# Patient Record
Sex: MALE | Race: WHITE
[De-identification: names, ages, dates, MRNs, and addresses within clinical notes are randomized per-mention and may not be internally consistent; named-entity substitution may affect disease eponyms.]

---

## 2017-06-27 NOTE — CR
EXAMINATION: Portable chest radiograph.

 

HISTORY: Pain.

 

FINDINGS: 

The trachea is midline. The heart is normal in size. There is a left-sided pacemaker, valvular repla
cement, and median sternotomy wires. The cardiomediastinal silhouette is within normal limits. No pu
lmonary infiltrates, effusions or pneumothorax.

 

Osseous structures appear unremarkable.

 

IMPRESSION: 

No acute cardiopulmonary process.
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
## 2019-04-01 NOTE — OR
SURGEON:

Walter Luis MD

 

DATE OF PROCEDURE:  04/01/2019

 

ASSISTANT:

Angy Waters PA-C.

 

PREOPERATIVE DIAGNOSIS:

Left hip osteoarthritis.

 

POSTOPERATIVE DIAGNOSIS:

Left hip osteoarthritis.

 

OPERATION PERFORMED:

Left anterior total hip arthroplasty.

 

ANESTHESIA:

Spinal with sedation.

 

COMPLICATION:

None.

 

SPECIMENS:

Femoral head.

 

ESTIMATED BLOOD LOSS:

100 mL.

 

IMPLANTS:

Flo Continuum trabecular metal shell with cluster holes, 58 mm outer

diameter, Vivacit-E neutral liner of 36-mm inner diameter, Fitmore hip stem

uncemented B extended offset size 7, Biolox ceramic femoral head 36 mm diameter,

and zero neck length.

 

INDICATIONS:

The patient is a 74-year-old male with severe arthritis.  He has failed

conservative management.  He has failed conservative management, modification

therapy, injections, chronic pain on a daily basis, hindering all activities.

He wished to undergo replacement.  He understands the risks, benefits, and

complications to include but not limited to infection, neurovascular injury,

continued pain, DVT, PE, stroke, MI, death, leg-length discrepancy, fracture,

dislocation, he wished to proceed.

 

PROCEDURE IN DETAIL:

The patient was seen in the preoperative area.  Operative extremity was marked

of the patient.  He was transferred to the operating room, where spinal

anesthesia was given.  He was placed supine on the [place] table, and sedation was

given.  The legs were placed in the leg bars with a narrow perineal post.  The

right hip was prepped and draped in the usual sterile fashion using alcohol

followed by ChloraPrep with Ioban covering.  He received preop antibiotics Ancef

2 g and TXA.  A 5 cm incision starting just lateral to the ASIS was made going

obliquely down the femur.  Hemostasis was obtained.  The fascia overlying the

TFL was opened lateral to lateral femoral cutaneous nerve.  The interval between

the TFL and sartorius deep between the abductors and rectus was opened.  The

anterior vessels were coagulated, and the vastus lateralis fascia was opened.

The deep Jesus Alberto retractor was placed.  The indirect head of the rectus was

released.  The capsule was held and tagged with two sutures.  Deep retractors

were placed.  The neck was cut from the saddle region to 1 cm below the lesser

trochanter, and the head was removed.  There was severe arthritis with complete

ossification of the labrum as well as inferior osteophyte.  The inferior capsule

was released, and portions of the iliopsoas tendon due to severe tightness.  The

pulvinar was removed, and the head measured approximately 51 to 52 mm.  It was

sequentially reamed from 51 to 57 mm going slightly superomedially to get good

fit and fill.  After planing the bed to make sure it was level, under

fluoroscopic control Continuum trabecular metal shell with cluster holes 58 mm

diameter was placed in 10 degrees anteversion and 40 degrees of abduction.  The

some of the labral ossification did break off.  It had excellent press fit.

Neutral liner was impacted.  The leg was then externally rotated, abducted, and

extended, and placed in a femoral lift.  The superior capsule obturator,

internus, and piriformis were released.  Central canal finder was utilized.  The

hip was sequentially broached from a starter rasp up to size a 6.  It was trial

reduced with extended offset 0 neck.  Printed overlay technique showed equal leg

length and offset, the stem to be slightly undersized, and therefore the hip was

dislocated, broach backed up to size 7, which did sink slightly below the cut.

Therefore a final stem B extended offset size 7 was impacted following the

native version.  It was trial reduced to zero neck length, which showed equal

leg length and offset compared to the opposite side.  Therefore, the hip was

dislocated.  The final stem 36 mm diameter, zero neck length was impacted.  The

hip was relocated.  There was no Shuck.  There was stable range of motion.  The

two tag sutures were tied together.  The wound was irrigated throughout the

case.  The fascia was closed with number one Vicryl, the subcutaneous tissues

with 2-0 Stratafix, and skin with running 4-0 Monocryl.  Dermabond, tape, and

Aquacel were placed.  The patient was transferred to the recovery room stable

condition.  Sponge and needle counts were correct at the end of the case.  There

were no complications.  He will be weightbearing status as tolerated, with

aspirin for DVT prophylaxis.

 

 

MARQUEZ AGOSTO

DD:  04/01/2019 14:56:11

DT:  04/01/2019 22:57:32

Job #:  [id number]

## 2019-04-01 NOTE — PCM.OPNOTE
- General Post-Op/Procedure Note


Date of Surgery/Procedure: 04/01/19


Operative Procedure(s): left anterior total hip arthroplasty


Findings: 





severe OA


Pre Op Diagnosis: left hip osteoarthritis


Post-Op Diagnosis: same


Anesthesia Technique: Moderate Sedation, Spinal


Primary Surgeon: Walter HINOJOSA Mai


Assistant: Angy Waters


Pathology: 





femoral head


EBL in mLs: 400


Complications: none


Condition: Good

## 2019-04-01 NOTE — CR
EXAMINATION: Left hip

 

HISTORY: Total hip hardware

 

COMPARISON: 2/22/2019

 

TECHNIQUE: 3 views

 

FINDINGS/IMPRESSION: Operative control films demonstrate placement of left total

hip hardware in good position and alignment.
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
## 2019-04-02 NOTE — PCM.DCSUM1
**Discharge Summary





- Hospital Course


Brief History: Patient was admitted for elective left total hip arthroplasty. 

Postoperatively he was measured floor where his pain was controlled his diet 

was advanced and he participated in physical therapy with weightbearing as 

tolerated. He did very well postoperatively and was subsequently discharged 

home on postoperative day #1. He'll receive aspirin for DVT prophylaxis. He'll 

follow-up in clinic in 2 weeks.


Diagnosis: Stroke: No





- Discharge Data


Discharge Date: 04/02/19


Discharge Disposition: Home, Self-Care 01


Condition: Good





- Patient Summary/Data


Operative Procedure(s) Performed: left anterior total hip arthroplasty


Consults: 


 Consultations





04/01/19 14:57


Consult to Physician [CONS] Routine 


PT Evaluation and Treatment [CONS] Routine 














- Patient Instructions


Diet: Usual Diet as Tolerated


Activity: Apply Ice, Full Weight Bearing


Driving, Other: may drive when not taking narcotics


Showering/Bathing: May Shower


Wound/Incision Care: Do NOT Change Dressing


Notify Provider of: Fever, Swelling and Redness, Drainage





- Discharge Plan


*PRESCRIPTION DRUG MONITORING PROGRAM REVIEWED*: No


*COPY OF PRESCRIPTION DRUG MONITORING REPORT IN PATIENT HEATHER: No


Home Medications: 


 Home Meds





Aspirin 325 mg PO QAM 06/27/17 [History]


Metoprolol Succinate [Toprol XL] 25 mg PO QAM 06/27/17 [History]


Sildenafil Citrate [Sildenafil] 50 mg PO ASDIRECTED PRN 06/27/17 [History]


Cholecalciferol (Vitamin D3) [Vitamin D3] 5,000 unit PO DAILY 03/28/19 [History]


Cyanocobalamin (Vitamin B12) [Vitamin B12] 1,000 mcg PO DAILY 03/28/19 [History]


Finasteride 5 mg PO QPM 03/28/19 [History]


Lisinopril 5 mg PO QAM 03/28/19 [History]


Multivit-Min/FA/Lycopen/Lutein [Centrum Silver Men Tablet] 1 tab PO DAILY 03/28/ 19 [History]


atorvaSTATin [Lipitor] 40 mg PO BEDTIME 03/28/19 [History]


metFORMIN [Glucophage XR] 500 mg PO BID 03/28/19 [History]








Patient Handouts:  Acetaminophen; Hydrocodone tablets or capsules, Total Hip 

Replacement, Easy-to-Read, Docusate capsules, Aspirin capsules or tablets 

extended release


Referrals: 


Angy Waters PA [Physician Assistant] - 04/16/19 10:20 am





- Discharge Summary/Plan Comment


DC Time >30 min.: No





- Patient Data


Vitals - Most Recent: 


 Last Vital Signs











Temp  37.0 C   04/02/19 11:57


 


Pulse  70   04/02/19 11:57


 


Resp  12   04/02/19 11:57


 


BP  126/57 L  04/02/19 11:57


 


Pulse Ox  93 L  04/02/19 11:57











Weight - Most Recent: 88.451 kg


I&O - Last 24 hours: 


 Intake & Output











 04/02/19 04/02/19 04/02/19





 06:59 14:59 22:59


 


Intake Total 1549 500 


 


Output Total 890 400 


 


Balance 659 100 











Lab Results - Last 24 hrs: 


 Laboratory Results - last 24 hr











  04/01/19 04/01/19 04/02/19 Range/Units





  19:00 19:00 05:10 


 


WBC  9.12    (4.0-11.0)  K/uL


 


RBC  3.88 L    (4.50-5.90)  M/uL


 


Hgb  12.0 L   10.6 L  (13.0-17.0)  g/dL


 


Hct  35.7 L   32.1 L  (38.0-50.0)  %


 


MCV  92.0    (80.0-98.0)  fL


 


MCH  30.9    (27.0-32.0)  pg


 


MCHC  33.6    (31.0-37.0)  g/dL


 


RDW Std Deviation  46.0    (28.0-62.0)  fl


 


RDW Coeff of Justen  14    (11.0-15.0)  %


 


Plt Count  203    (150-400)  K/uL


 


MPV  8.90    (7.40-12.00)  fL


 


Neut % (Auto)  75.8    (48.0-80.0)  %


 


Lymph % (Auto)  13.4 L    (16.0-40.0)  %


 


Mono % (Auto)  9.1    (0.0-15.0)  %


 


Eos % (Auto)  1.5    (0.0-7.0)  %


 


Baso % (Auto)  0.2    (0.0-1.5)  %


 


Neut # (Auto)  6.9 H    (1.4-5.7)  K/uL


 


Lymph # (Auto)  1.2    (0.6-2.4)  K/uL


 


Mono # (Auto)  0.8    (0.0-0.8)  K/uL


 


Eos # (Auto)  0.1    (0.0-0.7)  K/uL


 


Baso # (Auto)  0.0    (0.0-0.1)  K/uL


 


Nucleated RBC %  0.0    /100WBC


 


Nucleated RBCs #  0    K/uL


 


Sodium   143   (136-148)  mmol/L


 


Potassium   4.0   (3.5-5.1)  mmol/L


 


Chloride   108 H   ()  mmol/L


 


Carbon Dioxide   26.6   (21.0-32.0)  mmol/L


 


BUN   15   (7.0-18.0)  mg/dL


 


Creatinine   0.6 L   (0.8-1.3)  mg/dL


 


Est Cr Clr Drug Dosing   125.77   mL/min


 


Estimated GFR (MDRD)   > 60.0   ml/min


 


Glucose   158 H   ()  mg/dL


 


POC Glucose     ()  mg/dL


 


Calcium   9.3   (8.5-10.1)  mg/dL














  04/02/19 04/02/19 Range/Units





  05:10 06:12 


 


WBC    (4.0-11.0)  K/uL


 


RBC    (4.50-5.90)  M/uL


 


Hgb    (13.0-17.0)  g/dL


 


Hct    (38.0-50.0)  %


 


MCV    (80.0-98.0)  fL


 


MCH    (27.0-32.0)  pg


 


MCHC    (31.0-37.0)  g/dL


 


RDW Std Deviation    (28.0-62.0)  fl


 


RDW Coeff of Justen    (11.0-15.0)  %


 


Plt Count    (150-400)  K/uL


 


MPV    (7.40-12.00)  fL


 


Neut % (Auto)    (48.0-80.0)  %


 


Lymph % (Auto)    (16.0-40.0)  %


 


Mono % (Auto)    (0.0-15.0)  %


 


Eos % (Auto)    (0.0-7.0)  %


 


Baso % (Auto)    (0.0-1.5)  %


 


Neut # (Auto)    (1.4-5.7)  K/uL


 


Lymph # (Auto)    (0.6-2.4)  K/uL


 


Mono # (Auto)    (0.0-0.8)  K/uL


 


Eos # (Auto)    (0.0-0.7)  K/uL


 


Baso # (Auto)    (0.0-0.1)  K/uL


 


Nucleated RBC %    /100WBC


 


Nucleated RBCs #    K/uL


 


Sodium  138   (136-148)  mmol/L


 


Potassium  3.9   (3.5-5.1)  mmol/L


 


Chloride  106   ()  mmol/L


 


Carbon Dioxide  23.5   (21.0-32.0)  mmol/L


 


BUN  14   (7.0-18.0)  mg/dL


 


Creatinine  0.5 L   (0.8-1.3)  mg/dL


 


Est Cr Clr Drug Dosing  150.92   mL/min


 


Estimated GFR (MDRD)  > 60.0   ml/min


 


Glucose  192 H   ()  mg/dL


 


POC Glucose   203 H  ()  mg/dL


 


Calcium  9.0   (8.5-10.1)  mg/dL











Med Orders - Current: 


 Current Medications








Discontinued Medications





Hydrocodone Bitart/Acetaminophen (Norco 325-7.5 Mg)  1 - 2 tab PO Q4H PRN


   PRN Reason: Pain


   Last Admin: 04/02/19 09:11 Dose:  1 tab


Al Hydroxide/Mg Hydroxide (Mag-Al Plus)  30 ml PO Q4H PRN


   PRN Reason: indigestion


Albuterol (Proventil Neb Soln)  2.5 mg NEB ONETIME PRN


   PRN Reason: Wheezing


Aspirin (Aspirin)  325 mg PO BID [facility]


   Last Admin: 04/02/19 09:16 Dose:  325 mg


Atorvastatin Calcium (Lipitor)  40 mg PO BEDTIME [facility]


   Last Admin: 04/01/19 20:38 Dose:  40 mg


Atropine Sulfate (Atropine 1 Mg/Ml)  0.5 mg IVPUSH ASDIRECTED PRN


   PRN Reason: Hypo-perfusion


Atropine Sulfate (Atropine 1 Mg/Ml)  1 mg IVPUSH ASDIRECTED PRN


   PRN Reason: Hypo-Perfusion


Bisacodyl (Dulcolax)  10 mg RECTAL DAILY PRN


   PRN Reason: Constipation


Cefazolin Sodium (Ancef) Confirm Administered Dose 2 gm .ROUTE .STK-MED ONE


   Stop: 04/01/19 12:12


Dextrose/Water (Dextrose 50% In Water)  50 ml IVPUSH ASDIRECTED PRN


   PRN Reason: Hypoglycemia


Diphenhydramine HCl (Benadryl)  25 - 50 mg PO Q6H PRN


   PRN Reason: Itching


Docusate Sodium (Colace)  100 mg PO BID [facility]


   Last Admin: 04/02/19 09:14 Dose:  100 mg


Ephedrine Sulfate (Ephedrine Sulfate) Confirm Administered Dose 50 mg .ROUTE 

.STK-MED ONE


   Stop: 04/01/19 13:49


Epinephrine HCl (Adrenalin)  1 mg IVPUSH ASDIRECTED PRN


   PRN Reason: ACLS Guidelines


Fentanyl (Sublimaze) Confirm Administered Dose 100 mcg .ROUTE .STK-MED ONE


   Stop: 04/01/19 12:06


Fentanyl (Sublimaze)  50 mcg IVPUSH Q5M PRN


   PRN Reason: Pain


Finasteride (Proscar)  5 mg PO QPM [facility]


   Last Admin: 04/01/19 18:04 Dose:  5 mg


Cefazolin Sodium/Dextrose 2 gm (/ Premix)  50 mls @ 100 mls/hr IV ONETIME [facility]


   Last Admin: 04/02/19 05:22 Dose:  100 mls/hr


Lactated Ringer's (Ringers, Lactated)  1,000 mls @ 100 mls/hr IV ASDIRECTED [facility]


   Last Admin: 04/02/19 03:51 Dose:  100 mls/hr


Sodium Chloride (Normal Saline) Confirm Administered Dose 20 mls @ as directed 

.ROUTE .STK-MED ONE


   Stop: 04/01/19 12:12


Cefazolin Sodium/Dextrose 2 gm (/ Premix)  50 mls @ 100 mls/hr IV Q8H [facility]


   Stop: 04/02/19 05:59


   Last Admin: 04/02/19 05:59 Dose:  100 mls/hr


Insulin Aspart (Novolog)  0 unit SUBCUT TIDAC [facility]; Protocol


   Last Admin: 04/02/19 07:55 Dose:  2 units


Lidocaine (Xylocaine-Mpf 2%) Confirm Administered Dose 5 ml .ROUTE .STK-MED ONE


   Stop: 04/01/19 12:08


Lisinopril (Prinivil)  5 mg PO [facility]


   Last Admin: 04/02/19 09:13 Dose:  5 mg


Metoprolol Succinate ([facility] Xl)  [facility]


   Last Admin: 04/02/19 09:15 Dose:  25 mg


Midazolam HCl (Versed 1 Mg/Ml) Confirm Administered Dose 2 mg .ROUTE .STK-MED 

ONE


   Stop: 04/01/19 12:06


Morphine Sulfate (Morphine Sulfate)  1 - 3 mg IV Q3H PRN


   PRN Reason: Pain


Naloxone HCl (Narcan)  0.1 mg IVPUSH ASDIRECTED PRN


   PRN Reason: Respiratory Depression


Ondansetron HCl (Zofran)  4 mg IV Q6HR PRN


   PRN Reason: NAUSEA/VOMITING


Multivit-Min/Fa/Lycopen/Lutein [ Centrum Silver Men Tablet]  1 each PO DAILY [facility]


   Last Admin: 04/02/19 09:19 Dose:  Not Given


Phenylephrine HCl ([place]-Synephrine) Confirm Administered Dose 10 mg .ROUTE .STK-

MED ONE


   Stop: 04/01/19 14:14


Propofol (Diprivan  20 Ml) Confirm Administered Dose 400 mg .ROUTE .STK-MED ONE


   Stop: 04/01/19 12:06


Sodium Chloride (Saline Flush)  10 ml FLUSH ASDIRECTED PRN


   PRN Reason: Keep Vein Open


Sodium Chloride (Saline Flush)  2.5 ml FLUSH ASDIRECTED PRN


   PRN Reason: Keep Vein Open


Tranexamic Acid (Cyklokapron)  2,000 mg IV ONETIME ONE


   Stop: 04/01/19 07:06


   Last Admin: 04/01/19 16:04 Dose:  Not Given


Tranexamic Acid (Cyklokapron) Confirm Administered Dose 2,000 mg .ROUTE .STK-

MED ONE


   Stop: 04/01/19 12:53

## 2019-04-02 NOTE — PCM.CONSN
- General Info


Date of Service: 04/02/19


Admission Dx/Problem (Free Text): 


 Admission Diagnosis/Problem





Admission Diagnosis/Problem      Hip replacement planned








Subjective Update: 





Feeling good this morning. No complaints. No chest pain or SOB. Eager to go 

home today. 


Functional Status: Reports: Pain Controlled, Tolerating Diet, Ambulating





- Review of Systems


General: Reports: No Symptoms.  Denies: Fever, Weakness, Fatigue


HEENT: Reports: No Symptoms.  Denies: Headaches, Sore Throat, Visual Changes


Pulmonary: Reports: No Symptoms.  Denies: Shortness of Breath, Cough, Sputum


Cardiovascular: Reports: No Symptoms.  Denies: Chest Pain


Gastrointestinal: Reports: No Symptoms.  Denies: Abdominal Pain, Nausea, 

Vomiting


Genitourinary: Reports: No Symptoms.  Denies: Dysuria, Frequency


Musculoskeletal: Reports: No Symptoms


Skin: Reports: No Symptoms


Neurological: Reports: No Symptoms


Psychiatric: Reports: No Symptoms





- Patient Data


Vitals - Most Recent: 


 Last Vital Signs











Temp  98.4 F   04/02/19 07:24


 


Pulse  70   04/02/19 09:15


 


Resp  14   04/02/19 07:24


 


BP  116/68   04/02/19 09:15


 


Pulse Ox  96   04/02/19 07:24











Weight - Most Recent: 88.451 kg


I&O - Last 24 Hours: 


 Intake & Output











 04/01/19 04/02/19 04/02/19





 22:59 06:59 14:59


 


Intake Total 50 1549 


 


Output Total  890 


 


Balance 50 659 











Lab Results Last 24 Hours: 


 Laboratory Results - last 24 hr











  04/01/19 04/01/19 04/01/19 Range/Units





  15:32 19:00 19:00 


 


WBC   9.12   (4.0-11.0)  K/uL


 


RBC   3.88 L   (4.50-5.90)  M/uL


 


Hgb   12.0 L   (13.0-17.0)  g/dL


 


Hct   35.7 L   (38.0-50.0)  %


 


MCV   92.0   (80.0-98.0)  fL


 


MCH   30.9   (27.0-32.0)  pg


 


MCHC   33.6   (31.0-37.0)  g/dL


 


RDW Std Deviation   46.0   (28.0-62.0)  fl


 


RDW Coeff of Justen   14   (11.0-15.0)  %


 


Plt Count   203   (150-400)  K/uL


 


MPV   8.90   (7.40-12.00)  fL


 


Neut % (Auto)   75.8   (48.0-80.0)  %


 


Lymph % (Auto)   13.4 L   (16.0-40.0)  %


 


Mono % (Auto)   9.1   (0.0-15.0)  %


 


Eos % (Auto)   1.5   (0.0-7.0)  %


 


Baso % (Auto)   0.2   (0.0-1.5)  %


 


Neut # (Auto)   6.9 H   (1.4-5.7)  K/uL


 


Lymph # (Auto)   1.2   (0.6-2.4)  K/uL


 


Mono # (Auto)   0.8   (0.0-0.8)  K/uL


 


Eos # (Auto)   0.1   (0.0-0.7)  K/uL


 


Baso # (Auto)   0.0   (0.0-0.1)  K/uL


 


Nucleated RBC %   0.0   /100WBC


 


Nucleated RBCs #   0   K/uL


 


Sodium    143  (136-148)  mmol/L


 


Potassium    4.0  (3.5-5.1)  mmol/L


 


Chloride    108 H  ()  mmol/L


 


Carbon Dioxide    26.6  (21.0-32.0)  mmol/L


 


BUN    15  (7.0-18.0)  mg/dL


 


Creatinine    0.6 L  (0.8-1.3)  mg/dL


 


Est Cr Clr Drug Dosing    125.77  mL/min


 


Estimated GFR (MDRD)    > 60.0  ml/min


 


Glucose    158 H  ()  mg/dL


 


POC Glucose  138 H    ()  mg/dL


 


Calcium    9.3  (8.5-10.1)  mg/dL














  04/02/19 04/02/19 04/02/19 Range/Units





  05:10 05:10 06:12 


 


WBC     (4.0-11.0)  K/uL


 


RBC     (4.50-5.90)  M/uL


 


Hgb  10.6 L    (13.0-17.0)  g/dL


 


Hct  32.1 L    (38.0-50.0)  %


 


MCV     (80.0-98.0)  fL


 


MCH     (27.0-32.0)  pg


 


MCHC     (31.0-37.0)  g/dL


 


RDW Std Deviation     (28.0-62.0)  fl


 


RDW Coeff of Justen     (11.0-15.0)  %


 


Plt Count     (150-400)  K/uL


 


MPV     (7.40-12.00)  fL


 


Neut % (Auto)     (48.0-80.0)  %


 


Lymph % (Auto)     (16.0-40.0)  %


 


Mono % (Auto)     (0.0-15.0)  %


 


Eos % (Auto)     (0.0-7.0)  %


 


Baso % (Auto)     (0.0-1.5)  %


 


Neut # (Auto)     (1.4-5.7)  K/uL


 


Lymph # (Auto)     (0.6-2.4)  K/uL


 


Mono # (Auto)     (0.0-0.8)  K/uL


 


Eos # (Auto)     (0.0-0.7)  K/uL


 


Baso # (Auto)     (0.0-0.1)  K/uL


 


Nucleated RBC %     /100WBC


 


Nucleated RBCs #     K/uL


 


Sodium   138   (136-148)  mmol/L


 


Potassium   3.9   (3.5-5.1)  mmol/L


 


Chloride   106   ()  mmol/L


 


Carbon Dioxide   23.5   (21.0-32.0)  mmol/L


 


BUN   14   (7.0-18.0)  mg/dL


 


Creatinine   0.5 L   (0.8-1.3)  mg/dL


 


Est Cr Clr Drug Dosing   150.92   mL/min


 


Estimated GFR (MDRD)   > 60.0   ml/min


 


Glucose   192 H   ()  mg/dL


 


POC Glucose    203 H  ()  mg/dL


 


Calcium   9.0   (8.5-10.1)  mg/dL











Med Orders - Current: 


 Current Medications





Hydrocodone Bitart/Acetaminophen (Norco 325-7.5 Mg)  1 - 2 tab PO Q4H PRN


   PRN Reason: Pain


   Last Admin: 04/02/19 09:11 Dose:  1 tab


Al Hydroxide/Mg Hydroxide (Mag-Al Plus)  30 ml PO Q4H PRN


   PRN Reason: indigestion


Albuterol (Proventil Neb Soln)  2.5 mg NEB ONETIME PRN


   PRN Reason: Wheezing


Aspirin (Aspirin)  325 mg PO BID [facility]


   Last Admin: 04/02/19 09:16 Dose:  325 mg


Atorvastatin Calcium (Lipitor)  40 mg PO BEDTIME [facility]


   Last Admin: 04/01/19 20:38 Dose:  40 mg


Atropine Sulfate (Atropine 1 Mg/Ml)  0.5 mg IVPUSH ASDIRECTED PRN


   PRN Reason: Hypo-perfusion


Atropine Sulfate (Atropine 1 Mg/Ml)  1 mg IVPUSH ASDIRECTED PRN


   PRN Reason: Hypo-Perfusion


Bisacodyl (Dulcolax)  10 mg RECTAL DAILY PRN


   PRN Reason: Constipation


Dextrose/Water (Dextrose 50% In Water)  50 ml IVPUSH ASDIRECTED PRN


   PRN Reason: Hypoglycemia


Diphenhydramine HCl (Benadryl)  25 - 50 mg PO Q6H PRN


   PRN Reason: Itching


Docusate Sodium (Colace)  100 mg PO BID [facility]


   Last Admin: 04/02/19 09:14 Dose:  100 mg


Epinephrine HCl (Adrenalin)  1 mg IVPUSH ASDIRECTED PRN


   PRN Reason: ACLS Guidelines


Fentanyl (Sublimaze)  50 mcg IVPUSH Q5M PRN


   PRN Reason: Pain


Finasteride (Proscar)  5 mg PO QPM [facility]


   Last Admin: 04/01/19 18:04 Dose:  5 mg


Cefazolin Sodium/Dextrose 2 gm (/ Premix)  50 mls @ 100 mls/hr IV ONETIME [facility]


   Last Admin: 04/02/19 05:22 Dose:  100 mls/hr


Lactated Ringer's (Ringers, Lactated)  1,000 mls @ 100 mls/hr IV ASDIRECTED Critical [facility] hospital


   Last Admin: 04/02/19 03:51 Dose:  100 mls/hr


Insulin Aspart (Novolog)  0 unit SUBCUT TIDAC [facility]; Protocol


   Last Admin: 04/02/19 07:55 Dose:  2 units


[facility]inivil)  [facility]


   Last Admin: 04/02/19 09:13 Dose:  5 mg


Metoprolol Succinate (Toprol Xl)  [facility]


   Last Admin: 04/02/19 09:15 Dose:  25 mg


Morphine Sulfate (Morphine Sulfate)  1 - 3 mg IV Q3H PRN


   PRN Reason: Pain


Naloxone HCl (Narcan)  0.1 mg IVPUSH ASDIRECTED PRN


   PRN Reason: Respiratory Depression


Ondansetron HCl (Zofran)  4 mg IV Q6HR PRN


   PRN Reason: NAUSEA/VOMITING


Multivit-Min/Fa/Lycopen/Lutein [ Centrum Silver Men Tablet]  1 each PO DAILY [facility]


   Last Admin: 04/02/19 09:19 Dose:  Not Given


Sodium Chloride (Saline Flush)  10 ml FLUSH ASDIRECTED PRN


   PRN Reason: Keep Vein Open


Sodium Chloride (Saline Flush)  2.5 ml FLUSH ASDIRECTED PRN


   PRN Reason: Keep Vein Open





Discontinued Medications





Cefazolin Sodium (Ancef) Confirm Administered Dose 2 gm .ROUTE .STK-MED ONE


   Stop: 04/01/19 12:12


Ephedrine Sulfate (Ephedrine Sulfate) Confirm Administered Dose 50 mg .ROUTE 

.STK-MED ONE


   Stop: 04/01/19 13:49


Fentanyl (Sublimaze) Confirm Administered Dose 100 mcg .ROUTE .STK-MED ONE


   Stop: 04/01/19 12:06


Sodium Chloride (Normal Saline) Confirm Administered Dose 20 mls @ as directed 

.ROUTE .STK-MED ONE


   Stop: 04/01/19 12:12


Cefazolin Sodium/Dextrose 2 gm (/ Premix)  50 mls @ 100 mls/hr IV Q8H [facility]


   Stop: 04/02/19 05:59


   Last Admin: 04/02/19 05:59 Dose:  100 mls/hr


Lidocaine (Xylocaine-Mpf 2%) Confirm Administered Dose 5 ml .ROUTE .STK-MED ONE


   Stop: 04/01/19 12:08


Midazolam HCl (Versed 1 Mg/Ml) Confirm Administered Dose 2 mg .ROUTE .STK-MED 

ONE


   Stop: 04/01/19 12:06


Phenylephrine HCl ([place]-Synephrine) Confirm Administered Dose 10 mg .ROUTE .STK-

MED ONE


   Stop: 04/01/19 14:14


Propofol (Diprivan  20 Ml) Confirm Administered Dose 400 mg .ROUTE .STK-MED ONE


   Stop: 04/01/19 12:06


Tranexamic Acid (Cyklokapron)  2,000 mg IV ONETIME ONE


   Stop: 04/01/19 07:06


   Last Admin: 04/01/19 16:04 Dose:  Not Given


Tranexamic Acid (Cyklokapron) Confirm Administered Dose 2,000 mg .ROUTE .STK-

MED ONE


   Stop: 04/01/19 12:53











- Exam


General: Alert, Oriented, Cooperative


Lungs: Clear to Auscultation, Normal Respiratory Effort


Cardiovascular: Regular Rate, Regular Rhythm


GI/Abdominal Exam: Normal Bowel Sounds, Soft, Non-Tender, No Distention


Extremities: Normal Inspection, Normal Range of Motion, Non-Tender


Neurological: No New Focal Deficit


Psy/Mental Status: Alert, Normal Affect, Normal Mood





Consult PN Assessment/Plan


Procedures: 


Procedures





ASSAY OF CK (CPK) (06/27/17)


ASSAY OF FREE THYROXINE (07/29/15)


ASSAY OF NATRIURETIC PEPTIDE (06/27/17)


ASSAY OF TROPONIN QUANT (06/27/17)


ASSAY THYROID STIM HORMONE (07/29/15)


CHEST X-RAY 1 VIEW FRONTAL (06/27/17)


COMPLETE CBC W/AUTO DIFF WBC (03/05/19)


COMPREHEN METABOLIC PANEL (03/05/19)


CREATINE MB FRACTION (06/27/17)


DRAIN/INJ JOINT/BURSA W/O US (10/12/18)


ELECTROCARDIOGRAM TRACING (06/27/17)


EMERGENCY DEPT VISIT (06/27/17)


GLYCOSYLATED HEMOGLOBIN TEST (03/05/19)


HYDRATION IV INFUSION INIT (06/27/17)


LIPID PANEL (07/29/15)


NEEDLE LOCALIZATION BY XRAY (10/12/18)


PROTHROMBIN TIME (03/05/19)


ROUTINE VENIPUNCTURE (03/05/19)


THROMBOPLASTIN TIME PARTIAL (03/05/19)


URINALYSIS AUTO W/SCOPE (03/05/19)


URINE CULTURE/COLONY COUNT (03/05/19)


X-RAY EXAM CHEST 2 VIEWS (03/07/19)


X-RAY EXAM HIP UNI 2-3 VIEWS (02/22/19)


X-RAY EXAM OF HIP (09/18/14)


X-RAY EXAM OF PELVIS (09/18/14)








(1) Hx of CABG


SNOMED Code(s): 929912874, 340934934


   Code(s): Z95.1 - PRESENCE OF AORTOCORONARY BYPASS GRAFT   Current Visit: Yes

   





(2) Pacemaker


SNOMED Code(s): 975193446


   Code(s): Z95.0 - PRESENCE OF CARDIAC PACEMAKER   Current Visit: Yes   





(3) Hx of sick sinus syndrome


SNOMED Code(s): 523000999705742, 488107578139304


   Code(s): Z86.79 - PERSONAL HISTORY OF OTHER DISEASES OF THE CIRCULATORY 

SYSTEM   Current Visit: Yes   





(4) CAD (coronary artery disease)


SNOMED Code(s): 86682959


   Code(s): I25.10 - ATHSCL HEART DISEASE OF NATIVE CORONARY ARTERY W/O ANG 

PCTRS   Current Visit: Yes   





(5) S/P AVR


SNOMED Code(s): 3937881105250, 87248758, 0479636630631


   Code(s): Z95.2 - PRESENCE OF PROSTHETIC HEART VALVE   Current Visit: Yes   





(6) HTN (hypertension)


SNOMED Code(s): 09481024


   Code(s): I10 - ESSENTIAL (PRIMARY) HYPERTENSION   Current Visit: Yes   





(7) DM type 2 (diabetes mellitus, type 2)


SNOMED Code(s): 99297925


   Code(s): E11.9 - TYPE 2 DIABETES MELLITUS WITHOUT COMPLICATIONS   Current 

Visit: Yes   





(8) BPH (benign prostatic hyperplasia)


SNOMED Code(s): 952350769


   Code(s): N40.0 - BENIGN PROSTATIC HYPERPLASIA WITHOUT LOWER URINRY TRACT 

SYMP   Current Visit: Yes   


Problem List Initiated/Reviewed/Updated: Yes


My Orders Last 24 Hours: 


My Active Orders





04/01/19 15:25


Blood Glucose Check, Bedside [RC] TIDAC 





04/01/19 17:00


Insulin Aspart [NovoLOG]   See Protocol  SUBCUT TIDAC 











Plan: 





This 74 year old male admitted for L anterior hip arthroplasty, hospitalist 

service consulted for medical management.





1. S/P L anterior hip arthroplasty: Orders per Orthopedics





2. CAD: Stable.  Continue ASA and statin. No chest pain 4 METS at home. 





3. HTN: Stable, continue Lisinopril and Metoprolol





4. DM Type 2: Controlled, hold Metformin. Novolog SSI. Ok to restart Metformin 

on DC.





5. BPH: Stable, continue Proscar





VTE prophylaxis: Recommended when deemed appropriate by Orthopedics

## 2021-01-24 ENCOUNTER — HOSPITAL ENCOUNTER (EMERGENCY)
Dept: HOSPITAL 56 - MW.ED | Age: 76
Discharge: HOME | End: 2021-01-24
Payer: OTHER GOVERNMENT

## 2021-01-24 VITALS — DIASTOLIC BLOOD PRESSURE: 72 MMHG | HEART RATE: 66 BPM | SYSTOLIC BLOOD PRESSURE: 142 MMHG

## 2021-01-24 DIAGNOSIS — Z88.8: ICD-10-CM

## 2021-01-24 DIAGNOSIS — Z88.2: ICD-10-CM

## 2021-01-24 DIAGNOSIS — Z95.1: ICD-10-CM

## 2021-01-24 DIAGNOSIS — Z79.899: ICD-10-CM

## 2021-01-24 DIAGNOSIS — S01.511A: ICD-10-CM

## 2021-01-24 DIAGNOSIS — S02.2XXA: Primary | ICD-10-CM

## 2021-01-24 DIAGNOSIS — M19.90: ICD-10-CM

## 2021-01-24 DIAGNOSIS — Z79.82: ICD-10-CM

## 2021-01-24 DIAGNOSIS — I25.10: ICD-10-CM

## 2021-01-24 DIAGNOSIS — I10: ICD-10-CM

## 2021-01-24 DIAGNOSIS — E11.9: ICD-10-CM

## 2021-01-24 DIAGNOSIS — E78.00: ICD-10-CM
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
## 2021-01-24 DIAGNOSIS — Z23: ICD-10-CM

## 2021-01-24 DIAGNOSIS — Z79.84: ICD-10-CM

## 2021-01-24 LAB
BUN SERPL-MCNC: 19 MG/DL (ref 7–18)
CHLORIDE SERPL-SCNC: 106 MMOL/L (ref 98–107)
CO2 SERPL-SCNC: 22.5 MMOL/L (ref 21–32)
GLUCOSE SERPL-MCNC: 172 MG/DL (ref 74–106)
POTASSIUM SERPL-SCNC: 3.4 MMOL/L (ref 3.5–5.1)
SODIUM SERPL-SCNC: 142 MMOL/L (ref 136–148)

## 2021-01-24 PROCEDURE — 86901 BLOOD TYPING SEROLOGIC RH(D): CPT

## 2021-01-24 PROCEDURE — 72170 X-RAY EXAM OF PELVIS: CPT

## 2021-01-24 PROCEDURE — 86900 BLOOD TYPING SEROLOGIC ABO: CPT

## 2021-01-24 PROCEDURE — 71045 X-RAY EXAM CHEST 1 VIEW: CPT

## 2021-01-24 PROCEDURE — 86850 RBC ANTIBODY SCREEN: CPT

## 2021-01-24 PROCEDURE — 93005 ELECTROCARDIOGRAM TRACING: CPT

## 2021-01-24 PROCEDURE — 70486 CT MAXILLOFACIAL W/O DYE: CPT

## 2021-01-24 PROCEDURE — 85610 PROTHROMBIN TIME: CPT

## 2021-01-24 PROCEDURE — 72125 CT NECK SPINE W/O DYE: CPT

## 2021-01-24 PROCEDURE — 99284 EMERGENCY DEPT VISIT MOD MDM: CPT

## 2021-01-24 PROCEDURE — 80048 BASIC METABOLIC PNL TOTAL CA: CPT

## 2021-01-24 PROCEDURE — 82550 ASSAY OF CK (CPK): CPT

## 2021-01-24 PROCEDURE — 70450 CT HEAD/BRAIN W/O DYE: CPT

## 2021-01-24 PROCEDURE — 90471 IMMUNIZATION ADMIN: CPT

## 2021-01-24 PROCEDURE — 85025 COMPLETE CBC W/AUTO DIFF WBC: CPT

## 2021-01-24 PROCEDURE — 12011 RPR F/E/E/N/L/M 2.5 CM/<: CPT

## 2021-01-24 PROCEDURE — 83735 ASSAY OF MAGNESIUM: CPT

## 2021-01-24 NOTE — CT
HISTORY:



Fall while shoveling snow. Facial trauma.



COMPARISON:



CT head 01/24/2021.



FINDINGS:



There is an acute comminuted minimally displaced nasal bone fracture with 

associated soft tissue swelling about the nose. Fluid opacification of 

ethmoid air cells as well as air-fluid level in the left maxillary sinus. 

The maxillary sinus walls appears intact. Minimal fluid in the sphenoid 

sinus. The frontal sinus is clear. Polypoid mucosal thickening is noted in 

the right maxillary sinus.



IMPRESSION:



Acute comminuted mildly displaced nasal bone fracture with associated soft 

tissue swelling of the nose. Likely secondary fluid within the ethmoid air 

cells left maxillary sinus and sphenoid sinuses.



Please note that all CT scans at this facility use dose modulation, 

iterative reconstruction, and/or weight-based dosing when appropriate to 

reduce radiation dose to as low as reasonably achievable.



Dictated by Mavis Orta MD @ Jan 24 2021  2:25PM



Signed by Dr. Mavis Orta @ Jan 24 2021  2:35PM

## 2021-01-24 NOTE — CR
HISTORY:



Fall.



COMPARISON:



02/22/2019.



FINDINGS:



There has been interval placement of left hip arthroplasty. Hardware 

appears intact and well seated. No evidence for acute fracture or 

dislocation. There are phleboliths within the pelvis. Postsurgical sutures 

in the midline in left pelvis, unchanged.



IMPRESSION:



No acute fracture or dislocation.



Dictated by Mavis Orta MD @ Jan 24 2021  2:25PM



Signed by Dr. Mavis Orta @ Jan 24 2021  2:27PM

## 2021-01-24 NOTE — CT
HISTORY:



Fall while shoveling. Facial trauma.



COMPARISON:



None.



TECHNIQUE:



Noncontrast axial images were obtained the cervical spine with sagittal and 

coronal reconstructions.



FINDINGS:



Mild multilevel degenerative changes. The central neural canal is patent. 

No evidence for acute fracture or dislocation.  Alignment is within normal. 

Prevertebral soft tissues are within normal. Air-fluid levels appreciated 

within the left maxillary sinus.



Please note that all CT scans at this facility use dose modulation, 

iterative reconstruction, and/or weight-based dosing when appropriate to 

reduce radiation dose to as low as reasonably achievable.



Dictated by Mavis Orta MD @ Jan 24 2021  2:25PM



Signed by Dr. Mavis Orta @ Jan 24 2021  2:40PM

## 2021-01-24 NOTE — CR
HISTORY:



Fall while shoveling. Trauma.



COMPARISON:



03/07/2019.



FINDINGS:



Single frontal view of the chest. The lungs are clear. No evidence for 

pneumonia. Heart size and pulmonary vascularity are within normal limits. 

No evidence for pleural effusion. Bony structures appear intact.



Dictated by Mavis Orta MD @ Jan 24 2021  2:23PM



Signed by Dr. Mavis Orta @ Jan 24 2021  2:23PM
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
## 2021-01-24 NOTE — CT
HISTORY:



Fall. Facial trauma.



COMPARISON:



None.



TECHNIQUE:



Noncontrast axial images were obtained through the brain.



FINDINGS:



Gray-white matter differentiation is preserved. No evidence for acute 

intracranial hemorrhage or infarction. No midline shift or mass effect. The 

ventricles are nondilated and symmetric. No abnormal intra or extra-axial 

fluid collection. There is a comminuted minimally displaced nasal bone 

fracture. Soft tissue swelling about the nose. There is a small amount of 

fluid within the left maxillary sinus and fluid opacification of ethmoid 

air cells.



IMPRESSION:



No acute intracranial pathology. 



Acute nasal bone fracture.



Please note that all CT scans at this facility use dose modulation, 

iterative reconstruction, and/or weight-based dosing when appropriate to 

reduce radiation dose to as low as reasonably achievable.



Dictated by Mavis Orta MD @ Jan 24 2021  2:27PM



Signed by Dr. Mavis Orta @ Jan 24 2021  2:32PM

## 2022-07-14 ENCOUNTER — HOSPITAL ENCOUNTER (EMERGENCY)
Dept: HOSPITAL 56 - MW.ED | Age: 77
Discharge: HOME | End: 2022-07-14
Payer: MEDICARE

## 2022-07-14 VITALS — SYSTOLIC BLOOD PRESSURE: 141 MMHG | HEART RATE: 67 BPM | DIASTOLIC BLOOD PRESSURE: 79 MMHG

## 2022-07-14 DIAGNOSIS — E78.00: ICD-10-CM

## 2022-07-14 DIAGNOSIS — Z88.8: ICD-10-CM

## 2022-07-14 DIAGNOSIS — Z79.84: ICD-10-CM

## 2022-07-14 DIAGNOSIS — Z79.82: ICD-10-CM
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
## 2022-07-14 DIAGNOSIS — S93.402A: Primary | ICD-10-CM

## 2022-07-14 DIAGNOSIS — Z86.16: ICD-10-CM

## 2022-07-14 DIAGNOSIS — I25.10: ICD-10-CM

## 2022-07-14 DIAGNOSIS — Z88.2: ICD-10-CM

## 2022-07-14 DIAGNOSIS — Z79.899: ICD-10-CM

## 2022-07-14 DIAGNOSIS — E11.9: ICD-10-CM

## 2025-02-26 ENCOUNTER — HOSPITAL ENCOUNTER (EMERGENCY)
Dept: HOSPITAL 56 - MW.ED | Age: 80
Discharge: HOME | End: 2025-02-26
Payer: MEDICARE

## 2025-02-26 VITALS — HEART RATE: 63 BPM | SYSTOLIC BLOOD PRESSURE: 159 MMHG | DIASTOLIC BLOOD PRESSURE: 88 MMHG

## 2025-02-26 DIAGNOSIS — Z95.0: ICD-10-CM

## 2025-02-26 DIAGNOSIS — Z79.899: ICD-10-CM

## 2025-02-26 DIAGNOSIS — E78.00: ICD-10-CM

## 2025-02-26 DIAGNOSIS — N20.2: Primary | ICD-10-CM

## 2025-02-26 DIAGNOSIS — I10: ICD-10-CM

## 2025-02-26 DIAGNOSIS — Z95.1: ICD-10-CM

## 2025-02-26 DIAGNOSIS — Z75.8: ICD-10-CM

## 2025-02-26 DIAGNOSIS — I25.10: ICD-10-CM

## 2025-02-26 DIAGNOSIS — Z88.8: ICD-10-CM

## 2025-02-26 DIAGNOSIS — Z79.82: ICD-10-CM

## 2025-02-26 DIAGNOSIS — Z88.2: ICD-10-CM

## 2025-02-26 DIAGNOSIS — E11.9: ICD-10-CM

## 2025-02-26 LAB
APPEARANCE UR: (no result)
BACTERIA URNS QL MICRO: (no result)
BILIRUB UR STRIP-MCNC: NEGATIVE MG/DL
COLOR UR: YELLOW
EPI CELLS #/AREA URNS HPF: (no result) /[HPF]
GLUCOSE UR STRIP-MCNC: NEGATIVE MG/DL
KETONES UR STRIP-MCNC: NEGATIVE MG/DL
NITRITE UR QL: NEGATIVE
PH UR STRIP: 6 [PH] (ref 5–8)
PROT UR STRIP-MCNC: NEGATIVE MG/DL
RBC # URNS HPF: (no result) /ML
RBC UR QL: (no result)
SP GR UR STRIP: <= 1.005 (ref 1–1.03)
UROBILINOGEN UR STRIP-ACNC: 0.2 EU/DL (ref ?–2)
WBC UR QL: (no result)